# Patient Record
Sex: FEMALE | Race: WHITE | NOT HISPANIC OR LATINO | Employment: OTHER | URBAN - METROPOLITAN AREA
[De-identification: names, ages, dates, MRNs, and addresses within clinical notes are randomized per-mention and may not be internally consistent; named-entity substitution may affect disease eponyms.]

---

## 2019-08-09 ENCOUNTER — OFFICE VISIT (OUTPATIENT)
Dept: URGENT CARE | Facility: CLINIC | Age: 64
End: 2019-08-09
Payer: COMMERCIAL

## 2019-08-09 ENCOUNTER — HOSPITAL ENCOUNTER (OUTPATIENT)
Facility: MEDICAL CENTER | Age: 64
End: 2019-08-09
Attending: PHYSICIAN ASSISTANT
Payer: COMMERCIAL

## 2019-08-09 VITALS
OXYGEN SATURATION: 95 % | HEART RATE: 98 BPM | SYSTOLIC BLOOD PRESSURE: 126 MMHG | HEIGHT: 68 IN | WEIGHT: 186 LBS | RESPIRATION RATE: 14 BRPM | DIASTOLIC BLOOD PRESSURE: 72 MMHG | TEMPERATURE: 96.7 F | BODY MASS INDEX: 28.19 KG/M2

## 2019-08-09 DIAGNOSIS — N30.00 ACUTE CYSTITIS WITHOUT HEMATURIA: ICD-10-CM

## 2019-08-09 LAB
APPEARANCE UR: CLEAR
BILIRUB UR STRIP-MCNC: NORMAL MG/DL
COLOR UR AUTO: YELLOW
GLUCOSE UR STRIP.AUTO-MCNC: NORMAL MG/DL
KETONES UR STRIP.AUTO-MCNC: NORMAL MG/DL
LEUKOCYTE ESTERASE UR QL STRIP.AUTO: NORMAL
NITRITE UR QL STRIP.AUTO: NORMAL
PH UR STRIP.AUTO: 5 [PH] (ref 5–8)
PROT UR QL STRIP: NORMAL MG/DL
RBC UR QL AUTO: NORMAL
SP GR UR STRIP.AUTO: 1
UROBILINOGEN UR STRIP-MCNC: 0.2 MG/DL

## 2019-08-09 PROCEDURE — 87086 URINE CULTURE/COLONY COUNT: CPT

## 2019-08-09 PROCEDURE — 99204 OFFICE O/P NEW MOD 45 MIN: CPT | Performed by: PHYSICIAN ASSISTANT

## 2019-08-09 PROCEDURE — 81002 URINALYSIS NONAUTO W/O SCOPE: CPT | Performed by: PHYSICIAN ASSISTANT

## 2019-08-09 RX ORDER — ATORVASTATIN CALCIUM 40 MG/1
TABLET, FILM COATED ORAL
COMMUNITY
Start: 2019-03-04

## 2019-08-09 RX ORDER — PIOGLITAZONE HCL AND METFORMIN HCL 850; 15 MG/1; MG/1
TABLET ORAL
COMMUNITY
Start: 2019-02-20

## 2019-08-09 RX ORDER — MULTIVITAMIN
TABLET ORAL
COMMUNITY

## 2019-08-09 RX ORDER — PANTOPRAZOLE SODIUM 20 MG/1
TABLET, DELAYED RELEASE ORAL
COMMUNITY
Start: 2019-07-26

## 2019-08-09 RX ORDER — UBIDECARENONE 75 MG
CAPSULE ORAL
COMMUNITY

## 2019-08-09 RX ORDER — LISINOPRIL 5 MG/1
TABLET ORAL
COMMUNITY
Start: 2019-03-04

## 2019-08-09 RX ORDER — CEFDINIR 300 MG/1
300 CAPSULE ORAL EVERY 12 HOURS
Qty: 10 CAP | Refills: 0 | Status: SHIPPED | OUTPATIENT
Start: 2019-08-09 | End: 2019-08-14

## 2019-08-09 RX ORDER — LACTOBACILLUS ACIDOPHILUS 700MM CELL
CAPSULE ORAL
COMMUNITY

## 2019-08-09 RX ORDER — LEVOTHYROXINE SODIUM 0.07 MG/1
TABLET ORAL
COMMUNITY
Start: 2018-12-04

## 2019-08-09 RX ORDER — ASPIRIN 81 MG/1
81 TABLET ORAL
COMMUNITY
Start: 2019-07-26 | End: 2019-08-26

## 2019-08-09 ASSESSMENT — ENCOUNTER SYMPTOMS
SHORTNESS OF BREATH: 0
BACK PAIN: 0
FEVER: 0
COUGH: 0
DIZZINESS: 0
CHILLS: 0
VOMITING: 0
HEADACHES: 0
ABDOMINAL PAIN: 1
MYALGIAS: 0
NAUSEA: 0
FLANK PAIN: 0
PALPITATIONS: 0

## 2019-08-09 NOTE — PROGRESS NOTES
"Subjective:      Shagufta Childs is a 64 y.o. female who presents with UTI            Dysuria    This is a new problem. The problem occurs every urination. The problem has been unchanged. The quality of the pain is described as burning. The pain is moderate. There has been no fever. There is a history of pyelonephritis. Associated symptoms include frequency and urgency. Pertinent negatives include no chills, flank pain, hematuria, nausea or vomiting. She has tried nothing for the symptoms. Her past medical history is significant for recurrent UTIs. There is no history of kidney stones.     Past Medical History:   Diagnosis Date   • Diabetes (HCC)    • GERD (gastroesophageal reflux disease)    • Thyroid disease        History reviewed. No pertinent surgical history.    Family History   Problem Relation Age of Onset   • Heart Disease Mother    • Hypertension Mother    • Heart Disease Father    • Diabetes Father        Allergies   Allergen Reactions   • Alcohol      Rubbing alcohol-Skin rash like poison oak       Medications, Allergies, and current problem list reviewed today in Epic      Review of Systems   Constitutional: Positive for malaise/fatigue. Negative for chills and fever.   Respiratory: Negative for cough and shortness of breath.    Cardiovascular: Negative for chest pain, palpitations and leg swelling.   Gastrointestinal: Positive for abdominal pain (lower abdominal pressure ). Negative for nausea and vomiting.   Genitourinary: Positive for dysuria, frequency and urgency. Negative for flank pain and hematuria.   Musculoskeletal: Negative for back pain and myalgias.   Neurological: Negative for dizziness and headaches.     All other systems reviewed and are negative.        Objective:     /72   Pulse 98   Temp 35.9 °C (96.7 °F) (Temporal)   Resp 14   Ht 1.727 m (5' 8\")   Wt 84.4 kg (186 lb)   SpO2 95%   BMI 28.28 kg/m²      Physical Exam   Constitutional: She is oriented to person, place, and time. " She appears well-developed and well-nourished. No distress.   HENT:   Head: Normocephalic and atraumatic.   Eyes: Conjunctivae are normal.   Pulmonary/Chest: Effort normal. No respiratory distress.   Abdominal: Soft. She exhibits no distension and no mass. There is no tenderness. There is no rigidity, no rebound, no guarding and no CVA tenderness.   Neurological: She is alert and oriented to person, place, and time. No cranial nerve deficit.   Skin: Skin is warm and dry. No rash noted.   Psychiatric: She has a normal mood and affect. Her behavior is normal. Judgment and thought content normal.       UA: leuks moderate, blood- positive        Assessment/Plan:     1. Acute cystitis without hematuria  POCT Urinalysis    Urine Culture    cefdinir (OMNICEF) 300 MG Cap           •  cefdinir (OMNICEF) 300 MG Cap, Take 1 Cap by mouth every 12 hours for 5 days., Disp: 10 Cap, Rfl: 0     Differential diagnoses, Supportive care, and indications for immediate follow-up discussed with patient.   Instructed to return to clinic or nearest emergency department for any change in condition, further concerns, or worsening of symptoms.    The patient demonstrated a good understanding and agreed with the treatment plan.    Yelitza Perry P.A.-C.

## 2019-08-12 LAB
BACTERIA UR CULT: NORMAL
SIGNIFICANT IND 70042: NORMAL
SITE SITE: NORMAL
SOURCE SOURCE: NORMAL